# Patient Record
Sex: MALE | Race: OTHER | HISPANIC OR LATINO
[De-identification: names, ages, dates, MRNs, and addresses within clinical notes are randomized per-mention and may not be internally consistent; named-entity substitution may affect disease eponyms.]

---

## 2021-01-27 ENCOUNTER — NON-APPOINTMENT (OUTPATIENT)
Age: 41
End: 2021-01-27

## 2021-01-28 PROBLEM — Z00.00 ENCOUNTER FOR PREVENTIVE HEALTH EXAMINATION: Status: ACTIVE | Noted: 2021-01-28

## 2021-01-29 ENCOUNTER — APPOINTMENT (OUTPATIENT)
Dept: OTOLARYNGOLOGY | Facility: CLINIC | Age: 41
End: 2021-01-29
Payer: COMMERCIAL

## 2021-01-29 VITALS
TEMPERATURE: 97.6 F | HEART RATE: 82 BPM | HEIGHT: 69 IN | WEIGHT: 200 LBS | DIASTOLIC BLOOD PRESSURE: 83 MMHG | SYSTOLIC BLOOD PRESSURE: 131 MMHG | BODY MASS INDEX: 29.62 KG/M2

## 2021-01-29 DIAGNOSIS — Z86.39 PERSONAL HISTORY OF OTHER ENDOCRINE, NUTRITIONAL AND METABOLIC DISEASE: ICD-10-CM

## 2021-01-29 DIAGNOSIS — Z78.9 OTHER SPECIFIED HEALTH STATUS: ICD-10-CM

## 2021-01-29 DIAGNOSIS — Z82.49 FAMILY HISTORY OF ISCHEMIC HEART DISEASE AND OTHER DISEASES OF THE CIRCULATORY SYSTEM: ICD-10-CM

## 2021-01-29 DIAGNOSIS — M26.09 OTHER SPECIFIED ANOMALIES OF JAW SIZE: ICD-10-CM

## 2021-01-29 PROCEDURE — 99203 OFFICE O/P NEW LOW 30 MIN: CPT | Mod: 25

## 2021-01-29 PROCEDURE — 99072 ADDL SUPL MATRL&STAF TM PHE: CPT

## 2021-01-29 PROCEDURE — 31575 DIAGNOSTIC LARYNGOSCOPY: CPT

## 2021-02-16 NOTE — PHYSICAL EXAM
[Midline] : trachea located in midline position [Laryngoscopy Performed] : laryngoscopy was performed, see procedure section for findings [Normal] : no rashes [FreeTextEntry1] : No hoarseness. [] : septum deviated bilaterally [de-identified] : Mallampati 3 airway.  large tongue in relation to size mandibular arch. [de-identified] : 3+ bilateral, no erythema or exudate. [FreeTextEntry2] : mild micrognathia. [de-identified] : Carotid pulses 2+ bilateral.

## 2021-02-16 NOTE — PROCEDURE
[de-identified] : \par Indication:   CHICHI\par -Verbal consent was obtained from patient prior to procedure.\par -Luis Fernando-Synephrine and lidocaine 2% spray applied to the nasal cavities.\par Flexible laryngoscopy was performed via  right  nostril and revealed the following:\par   -- S- shaped septum\par   -- Nasopharynx had no mass or exudate.  Moderate adenoid size\par   -- Base of tongue was symmetric and not enlarged.\par   -- Vallecula was clear.   [place] tonsils are very large and protruding into the airway\par   -- Epiglottis, both aryepiglottic folds and both false vocal folds were normal\par   -- Arytenoids both without edema and erythema \par   -- True vocal folds were fully mobile and without lesions. \par   -- Post cricoid area was clear.\par   -- Interarytenoid edema was absent     \par \par The patient tolerated the procedure well.\par

## 2021-02-16 NOTE — REVIEW OF SYSTEMS
[Patient Intake Form Reviewed] : Patient intake form was reviewed [Hoarseness] : hoarseness [Itching] : itching [Negative] : Genitourinary [As Noted in HPI] : as noted in HPI [Throat Pain] : throat pain [de-identified] : trouble swallowing [de-identified] : headaches

## 2021-02-16 NOTE — CONSULT LETTER
[Dear  ___] : Dear  [unfilled], [Courtesy Letter:] : I had the pleasure of seeing your patient, [unfilled], in my office today. [Please see my note below.] : Please see my note below. [Sincerely,] : Sincerely, [FreeTextEntry2] : Sadaf Ch MD\par [address] [place] [address], [address] Floor\par [facility] [place] [address]\par  [FreeTextEntry3] : \par Darcy Alexandra MD \par Otolaryngology, Head and Neck Surgery \par \par

## 2021-02-16 NOTE — HISTORY OF PRESENT ILLNESS
[de-identified] : Mr. Eloy Mary is a 40 year old man who presents for repeat tonsil infections.\par Wife is acting as  per patient request..\par PCP is Dr. Ch.\par \par He has been getting frequent tonsil infections - 12 or more/ year x over 15 years. Every infection is strep positive and he takes amoxicillin. Last infection >3 weeks ago.\par No throat pain today.  Has ear pain every time his tonsils are infected.  Never had tonsillar abscess.\par Missing a lot of work because of the infection.  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 Wakes up tired. Never had sleep apnea test.\par

## 2021-02-16 NOTE — ASSESSMENT
[FreeTextEntry1] : Mr. Eloy Mary was evaluated for the following issues today:\par \par 1.) Recurrent acute strep tonsilitis, with 12 episode/year for over 5 years.\par He requires antibiotics for each episode and misses work every time.\par Has tonsillith in between infections but no throat pain daily.  \par Has tonsil and adenoid hypertrophy.\par --> Recommend T&A to treat infections.  I have explained the risks of tonsillectomy and adenoidectomy including but not limited to general anesthesia, bleeding, infection, injury to the teeth/lips/gums, tonsil and/or adenoid regrowth, persistence of symptoms, velopharyngeal insufficiency, nasopharyngeal stenosis, swallowing dysfunction, post-operative hemorrhage, voice changes, and possible need for further procedures.\par Will discuss surgery again after sleep study.\par \par 2.) Snoring and sx c/w CHICHI.  Has large palatine tonsils but also relatively small mandible so tongue sits more posteriorly\par --> Sleep study\par \par Return after sleep study\par

## 2021-02-22 ENCOUNTER — OUTPATIENT (OUTPATIENT)
Dept: OUTPATIENT SERVICES | Facility: HOSPITAL | Age: 41
LOS: 1 days | End: 2021-02-22
Payer: COMMERCIAL

## 2021-02-22 ENCOUNTER — APPOINTMENT (OUTPATIENT)
Dept: SLEEP CENTER | Facility: HOSPITAL | Age: 41
End: 2021-02-22

## 2021-02-22 DIAGNOSIS — G47.33 OBSTRUCTIVE SLEEP APNEA (ADULT) (PEDIATRIC): ICD-10-CM

## 2021-02-22 PROCEDURE — 95810 POLYSOM 6/> YRS 4/> PARAM: CPT

## 2021-02-22 PROCEDURE — 95810 POLYSOM 6/> YRS 4/> PARAM: CPT | Mod: 26

## 2021-02-24 ENCOUNTER — TRANSCRIPTION ENCOUNTER (OUTPATIENT)
Age: 41
End: 2021-02-24

## 2021-03-10 ENCOUNTER — APPOINTMENT (OUTPATIENT)
Dept: OTOLARYNGOLOGY | Facility: CLINIC | Age: 41
End: 2021-03-10
Payer: COMMERCIAL

## 2021-03-10 VITALS
HEIGHT: 69 IN | BODY MASS INDEX: 30.07 KG/M2 | WEIGHT: 203 LBS | SYSTOLIC BLOOD PRESSURE: 153 MMHG | DIASTOLIC BLOOD PRESSURE: 77 MMHG | TEMPERATURE: 96.5 F | HEART RATE: 76 BPM

## 2021-03-10 DIAGNOSIS — R06.83 SNORING: ICD-10-CM

## 2021-03-10 PROCEDURE — 99072 ADDL SUPL MATRL&STAF TM PHE: CPT

## 2021-03-10 PROCEDURE — 99214 OFFICE O/P EST MOD 30 MIN: CPT

## 2021-03-10 NOTE — HISTORY OF PRESENT ILLNESS
[de-identified] : Mr. Eloy Mary was seen in f/u for recurrent acute tonsillitis and possible CHICHI.\par He was accompanied by his wife, who was acting as  per patient request..\par PCP is Dr. Ch.\par \par No acute tonsillitis since visit at end of January 2021.  No throat pain  or trouble swallowing at recently.\par Hx of frequent tonsil infections - 12 or more/ year x over 15 years. Every infection is strep positive and treated w/ amox.  Last infection was in late Dec/early Jan 2021.\par Missing a lot of work because of the infections.  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 Wakes up tired. \par \par \par SLEEP STUDY (02/22/2021) at [facility]:\par - AHI 9.6 (AASM)/5.4 (CMS criteria) overall.  REM AHI was 27.\par - Snoring was observed.\par - Mean O2 SAt 94%. Gregorio 88%.\par - PLM index 0.2\par - EKG with avg HR 69 during sleep (range 42-87), with NSR predominant.\par \par

## 2021-03-10 NOTE — HISTORY OF PRESENT ILLNESS
[de-identified] : Mr. Eloy Mary was seen in f/u for recurrent acute tonsillitis and possible CHICHI.\par He was accompanied by his wife, who was acting as  per patient request..\par PCP is Dr. Ch.\par \par No acute tonsillitis since visit at end of January 2021.  No throat pain  or trouble swallowing at recently.\par Hx of frequent tonsil infections - 12 or more/ year x over 15 years. Every infection is strep positive and treated w/ amox.  Last infection was in late Dec/early Jan 2021.\par Missing a lot of work because of the infections.  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 Wakes up tired. \par \par \par SLEEP STUDY (02/22/2021) at [facility]:\par - AHI 9.6 (AASM)/5.4 (CMS criteria) overall.  REM AHI was 27.\par - Snoring was observed.\par - Mean O2 SAt 94%. Gregorio 88%.\par - PLM index 0.2\par - EKG with avg HR 69 during sleep (range 42-87), with NSR predominant.\par \par

## 2021-03-10 NOTE — ASSESSMENT
[FreeTextEntry1] : Mr. Eloy Mary was evaluated for the following issues today:\par \par 1.) Recurrent acute strep tonsillitis, with 12 episodes/year for over 5 years, plus chronic tonsillitis with tonsilliths often.  \par 2.) Mild/borderline CHICHI related to T/A hypertrophy and smaller mandible than tongue size.\par \par --> I recommended T&A to treat the infections and improve the CHICHI.  I have explained the risks of tonsillectomy and adenoidectomy including but not limited to general anesthesia, bleeding, infection, injury to the teeth/lips/gums, tonsil and/or adenoid regrowth, persistence of symptoms, velopharyngeal insufficiency, nasopharyngeal stenosis, swallowing dysfunction, post-operative hemorrhage, voice changes, and possible need for further procedures.\par He understands that he may still have snoring after surgery.\par Questions from pt and his wife were answered.\par Surgery has been scheduled for March 29, 2021, at [facility].\par Medical evaluation and optimization by Dr. Ch would be greatly appreciated.\par \par \par

## 2021-03-10 NOTE — PHYSICAL EXAM
[FreeTextEntry1] : No hoarseness. [] : septum deviated bilaterally [Midline] : trachea located in midline position [de-identified] : Mallampati 3 airway.  Large tongue in relation to size of mandibular arch. [de-identified] : 3+ bilateral, endophytic [Normal] : no neck adenopathy

## 2021-03-10 NOTE — ASSESSMENT
[FreeTextEntry1] : Mr. Eloy Mary was evaluated for the following issues today:\par \par 1.) Recurrent acute strep tonsillitis, with 12 episodes/year for over 5 years, plus chronic tonsillitis with tonsilliths often.  \par 2.) Mild/borderline CHICHI related to T/A hypertrophy and smaller mandible than tongue size.\par \par --> I recommended T&A to treat the infections and improve the CHICHI.  I have explained the risks of tonsillectomy and adenoidectomy including but not limited to general anesthesia, bleeding, infection, injury to the teeth/lips/gums, tonsil and/or adenoid regrowth, persistence of symptoms, velopharyngeal insufficiency, nasopharyngeal stenosis, swallowing dysfunction, post-operative hemorrhage, voice changes, and possible need for further procedures.\par He understands that he may still have snoring after surgery.\par Questions from pt and his wife were answered.\par Surgery has been scheduled for March 29, 2021, at [facility].\par Medical evaluation and optimization by Dr. Ch would be greatly appreciated.\par \par \par

## 2021-03-10 NOTE — CONSULT LETTER
[Dear  ___] : Dear  [unfilled], [Courtesy Letter:] : I had the pleasure of seeing your patient, [unfilled], in my office today. [Please see my note below.] : Please see my note below. [Sincerely,] : Sincerely, [FreeTextEntry2] : Sadaf Ch MD\par [address] [place] [address], [address] Floor\par [facility] [place] [address]\par  [FreeTextEntry3] : \par Darcy Alexandra MD \par Otolaryngology, Head and Neck Surgery \par \par   [___] : [unfilled]

## 2021-03-10 NOTE — CONSULT LETTER
[Dear  ___] : Dear  [unfilled], [Courtesy Letter:] : I had the pleasure of seeing your patient, [unfilled], in my office today. [Please see my note below.] : Please see my note below. [Sincerely,] : Sincerely, [FreeTextEntry2] : Sadaf Ch MD\par [address] [place] [address], [address] Floor\par [place] [address]\par  [FreeTextEntry3] : \par Darcy Alexandra MD \par Otolaryngology, Head and Neck Surgery \par \par   [___] : [unfilled]

## 2021-03-10 NOTE — PHYSICAL EXAM
[FreeTextEntry1] : No hoarseness. [] : septum deviated bilaterally [Midline] : trachea located in midline position [de-identified] : Mallampati 3 airway.  Large tongue in relation to size of mandibular arch. [de-identified] : 3+ bilateral, endophytic [Normal] : no neck adenopathy

## 2021-03-26 ENCOUNTER — LABORATORY RESULT (OUTPATIENT)
Age: 41
End: 2021-03-26

## 2021-03-29 ENCOUNTER — OUTPATIENT (OUTPATIENT)
Dept: OUTPATIENT SERVICES | Facility: HOSPITAL | Age: 41
LOS: 1 days | Discharge: HOME | End: 2021-03-29
Payer: COMMERCIAL

## 2021-03-29 ENCOUNTER — RESULT REVIEW (OUTPATIENT)
Age: 41
End: 2021-03-29

## 2021-03-29 ENCOUNTER — NON-APPOINTMENT (OUTPATIENT)
Age: 41
End: 2021-03-29

## 2021-03-29 ENCOUNTER — APPOINTMENT (OUTPATIENT)
Dept: OTOLARYNGOLOGY | Facility: HOSPITAL | Age: 41
End: 2021-03-29

## 2021-03-29 PROCEDURE — 42821 REMOVE TONSILS AND ADENOIDS: CPT

## 2021-03-29 PROCEDURE — 88304 TISSUE EXAM BY PATHOLOGIST: CPT | Mod: 26

## 2021-03-31 LAB — SURGICAL PATHOLOGY STUDY: SIGNIFICANT CHANGE UP

## 2021-04-08 ENCOUNTER — APPOINTMENT (OUTPATIENT)
Dept: OTOLARYNGOLOGY | Facility: CLINIC | Age: 41
End: 2021-04-08
Payer: COMMERCIAL

## 2021-04-08 VITALS
BODY MASS INDEX: 29.62 KG/M2 | DIASTOLIC BLOOD PRESSURE: 83 MMHG | HEIGHT: 69 IN | HEART RATE: 105 BPM | TEMPERATURE: 97.3 F | WEIGHT: 200 LBS | SYSTOLIC BLOOD PRESSURE: 132 MMHG

## 2021-04-08 DIAGNOSIS — J35.3 HYPERTROPHY OF TONSILS WITH HYPERTROPHY OF ADENOIDS: ICD-10-CM

## 2021-04-08 DIAGNOSIS — Z90.89 ACQUIRED ABSENCE OF OTHER ORGANS: ICD-10-CM

## 2021-04-08 DIAGNOSIS — Z86.69 PERSONAL HISTORY OF OTHER DISEASES OF THE NERVOUS SYSTEM AND SENSE ORGANS: ICD-10-CM

## 2021-04-08 DIAGNOSIS — J03.91 ACUTE RECURRENT TONSILLITIS, UNSPECIFIED: ICD-10-CM

## 2021-04-08 PROCEDURE — 99024 POSTOP FOLLOW-UP VISIT: CPT

## 2021-04-08 RX ORDER — AMOXICILLIN 400 MG/5ML
400 FOR SUSPENSION ORAL TWICE DAILY
Qty: 3 | Refills: 0 | Status: COMPLETED | COMMUNITY
Start: 2021-03-29 | End: 2021-04-08

## 2021-04-08 RX ORDER — ATORVASTATIN CALCIUM 10 MG/1
10 TABLET, FILM COATED ORAL
Refills: 0 | Status: COMPLETED | COMMUNITY
End: 2021-04-08

## 2021-04-08 RX ORDER — BENZOCAINE/MENTH/CETYLPYRD CL 15 MG-2 MG
10-2.1 LOZENGE MUCOUS MEMBRANE
Qty: 7 | Refills: 1 | Status: COMPLETED | COMMUNITY
Start: 2021-03-29 | End: 2021-04-08

## 2021-04-08 RX ORDER — MOUTHWASH COMPOUNDING BASE 227
MOUTHWASH MUCOUS MEMBRANE
Qty: 1 | Refills: 1 | Status: COMPLETED | COMMUNITY
Start: 2021-03-29 | End: 2021-04-08

## 2021-04-08 RX ORDER — CHOLECALCIFEROL (VITAMIN D3) 1250 MCG
1.25 MG CAPSULE ORAL
Qty: 4 | Refills: 0 | Status: COMPLETED | COMMUNITY
Start: 2020-12-11 | End: 2021-04-08

## 2021-04-08 RX ORDER — OXYCODONE AND ACETAMINOPHEN 5; 325 MG/1; MG/1
5-325 TABLET ORAL
Qty: 25 | Refills: 0 | Status: COMPLETED | COMMUNITY
Start: 2021-03-29 | End: 2021-04-08

## 2021-04-08 NOTE — ASSESSMENT
[FreeTextEntry1] : Mr. SCHULTZ is recovering well after T&A for recurrent acute tonsillitis and T/A hypertrophy.\par His mild CHICHI sx have resolved.\par He is eating soft diet and has had no bleeding.\par He is happy with results of surgery\par \par I would be happy to see him again as needed. \par

## 2021-04-08 NOTE — PHYSICAL EXAM
[Midline] : trachea located in midline position [Removed] : palatine tonsils previously removed [de-identified] : Fibrinous exudate in tonsil fossae. [Normal] : no neck adenopathy

## 2021-04-08 NOTE — DATA REVIEWED
[de-identified] : \par SLEEP STUDY (02/22/2021) at [facility]:\par - AHI 9.6 (AASM)/5.4 (CMS criteria) overall. REM AHI was 27.\par - Snoring was observed.\par - Mean O2 SAt 94%. Gregorio 88%.\par - PLM index 0.2\par - EKG with avg HR 69 during sleep (range 42-87), with NSR predominan\par

## 2021-04-08 NOTE — CONSULT LETTER
[Dear  ___] : Dear  [unfilled], [Courtesy Letter:] : I had the pleasure of seeing your patient, [unfilled], in my office today. [Please see my note below.] : Please see my note below. [Sincerely,] : Sincerely, [FreeTextEntry2] : Sadaf Ch MD\par [address] [place] [address], [address] Floor\par [facility] [place] [address]\par  [FreeTextEntry3] : \par Darcy Alexandra MD \par Otolaryngology, Head and Neck Surgery \par \par

## 2021-04-08 NOTE — HISTORY OF PRESENT ILLNESS
[de-identified] : Ms. Page underwent T&A for recurrent acute tonsillitis on March 29, 2021, at [facility].\par \par Today he is doing well.\par Denies any pain or bleeding. \par He is able to talk without pain. \par Denies issues eating or swallowing.\par Breathing in sleep much better.  Snoring almost gone.  Wakes up feeling refreshed.\par \par \par PATHOLOGY (3/29/2021)\par 1.) Left tonsil -- Acute inflammation of crypt epithelium and follicular lymphoid hyperplasia\par 2.) Right tonsil -- Acute inflammation of crypt epithelium and follicular lymphoid hyperplasia\par \par